# Patient Record
Sex: FEMALE | Race: WHITE
[De-identification: names, ages, dates, MRNs, and addresses within clinical notes are randomized per-mention and may not be internally consistent; named-entity substitution may affect disease eponyms.]

---

## 2017-06-08 ENCOUNTER — HOSPITAL ENCOUNTER (EMERGENCY)
Dept: HOSPITAL 45 - C.EDB | Age: 82
Discharge: HOME | End: 2017-06-08
Payer: COMMERCIAL

## 2017-06-08 VITALS
HEIGHT: 62.01 IN | WEIGHT: 142.64 LBS | WEIGHT: 142.64 LBS | HEIGHT: 62.01 IN | BODY MASS INDEX: 25.92 KG/M2 | BODY MASS INDEX: 25.92 KG/M2

## 2017-06-08 VITALS
OXYGEN SATURATION: 98 % | TEMPERATURE: 97.7 F | HEART RATE: 72 BPM | SYSTOLIC BLOOD PRESSURE: 145 MMHG | DIASTOLIC BLOOD PRESSURE: 72 MMHG

## 2017-06-08 DIAGNOSIS — Z82.3: ICD-10-CM

## 2017-06-08 DIAGNOSIS — Z98.1: ICD-10-CM

## 2017-06-08 DIAGNOSIS — M81.0: ICD-10-CM

## 2017-06-08 DIAGNOSIS — K21.9: ICD-10-CM

## 2017-06-08 DIAGNOSIS — E78.5: ICD-10-CM

## 2017-06-08 DIAGNOSIS — N20.0: Primary | ICD-10-CM

## 2017-06-08 DIAGNOSIS — Z79.899: ICD-10-CM

## 2017-06-08 DIAGNOSIS — Z86.711: ICD-10-CM

## 2017-06-08 DIAGNOSIS — I10: ICD-10-CM

## 2017-06-08 DIAGNOSIS — E86.0: ICD-10-CM

## 2017-06-08 DIAGNOSIS — Z87.828: ICD-10-CM

## 2017-06-08 DIAGNOSIS — Z87.442: ICD-10-CM

## 2017-06-08 DIAGNOSIS — Z90.710: ICD-10-CM

## 2017-06-08 DIAGNOSIS — Z88.5: ICD-10-CM

## 2017-06-08 LAB
ALP SERPL-CCNC: 60 U/L (ref 45–117)
ALT SERPL-CCNC: 17 U/L (ref 12–78)
ANION GAP SERPL CALC-SCNC: 9 MMOL/L (ref 3–11)
APPEARANCE UR: CLEAR
AST SERPL-CCNC: 13 U/L (ref 15–37)
BASOPHILS # BLD: 0.01 K/UL (ref 0–0.2)
BASOPHILS NFR BLD: 0.1 %
BILIRUB UR-MCNC: (no result) MG/DL
BUN SERPL-MCNC: 20 MG/DL (ref 7–18)
BUN/CREAT SERPL: 11.7 (ref 10–20)
CALCIUM SERPL-MCNC: 8.1 MG/DL (ref 8.5–10.1)
CHLORIDE SERPL-SCNC: 112 MMOL/L (ref 98–107)
CO2 SERPL-SCNC: 25 MMOL/L (ref 21–32)
COLOR UR: (no result)
COMPLETE: YES
CREAT CL PREDICTED SERPL C-G-VRATE: 21.4 ML/MIN
CREAT SERPL-MCNC: 1.7 MG/DL (ref 0.6–1.2)
EOSINOPHIL NFR BLD AUTO: 120 K/UL (ref 130–400)
GLUCOSE SERPL-MCNC: 129 MG/DL (ref 70–99)
HCT VFR BLD CALC: 44.8 % (ref 37–47)
IG%: 0.1 %
IMM GRANULOCYTES NFR BLD AUTO: 16.9 %
LYMPHOCYTES # BLD: 1.21 K/UL (ref 1.2–3.4)
MANUAL MICROSCOPIC REQUIRED?: NO
MCH RBC QN AUTO: 28.9 PG (ref 25–34)
MCHC RBC AUTO-ENTMCNC: 32.1 G/DL (ref 32–36)
MCV RBC AUTO: 89.8 FL (ref 80–100)
MONOCYTES NFR BLD: 7 %
NEUTROPHILS # BLD AUTO: 2.2 %
NEUTROPHILS NFR BLD AUTO: 73.7 %
NITRITE UR QL STRIP: (no result)
PH UR STRIP: 5 [PH] (ref 4.5–7.5)
PMV BLD AUTO: 10.6 FL (ref 7.4–10.4)
POTASSIUM SERPL-SCNC: 3.8 MMOL/L (ref 3.5–5.1)
RBC # BLD AUTO: 4.99 M/UL (ref 4.2–5.4)
REVIEW REQ?: NO
SODIUM SERPL-SCNC: 146 MMOL/L (ref 136–145)
SP GR UR STRIP: 1.03 (ref 1–1.03)
URINE BILL WITH OR WITHOUT MIC: (no result)
UROBILINOGEN UR-MCNC: (no result) MG/DL
WBC # BLD AUTO: 7.16 K/UL (ref 4.8–10.8)
ZZURINE CULT IF INDIC CATH: NO

## 2017-06-08 NOTE — EMERGENCY ROOM VISIT NOTE
History


Report prepared by Thomas:  Donna Watters


Under the Supervision of:  Dr. Narda Green M.D.


First contact with patient:  11:10


Chief Complaint:  UNABLE TO VOID


Stated Complaint:  UNABLE TO URINATE


Nursing Triage Summary:  


pt states she was seen in the ER yesterday and had IV fluids.  States she does 


not feel the urge to urinate but feels she should be urinating more since 


yesterday.  Bladder scanned for 50 ml and 46 ml.





History of Present Illness


The patient is a 85 year old female who presents to the Emergency Room with 

complaints of constant difficulty urinating beginning yesterday. The patient 

states that she was seen here yesterday and was diagnosed with 2 right sided 

kidney stones. She reports that since she left the ED she has not been able to 

urinate and has only had about 1 drop of urine. She complains of dysuria and 

right sided flank pain. She denies any fever. The patient rates her pain as a 5/

10 in severity. She notes that yesterday her pain was a 15/10 so it has gotten 

better. She reports that she drinks a lot of water and is concerned that she 

has not been urinating. The patient notes that she has not had a bowel movement 

today but does not feel like she needs to go.





   Source of History:  patient


   Onset:  yesterday


   Position:  other (urinary)


   Symptom Intensity:  5/10


   Quality:  other (difficulty)


   Timing:  constant


   Modifying Factors (Worsening):  urination


   Associated Symptoms:  No fevers


Note:


She complains of dysuria and right flank pain.





Review of Systems


See HPI for pertinent positives & negatives. A total of 10 systems reviewed and 

were otherwise negative.





Past Medical & Surgical


Medical Problems:


(1) Bronchitis


(2) Dislocation of PIP joint of finger


(3) Esophageal Reflux


(4) Hyperlipidemia Nec/Nos


(5) Hypertension Nos


(6) Kidney stones


(7) Osteoporosis Nos


(8) Pulmonary embolism


Surgical Problems:


(1) S/P hysterectomy


(2) S/P lumbar fusion








Family History





Stroke





Social History


Smoking Status:  Never Smoker


Alcohol Use:  none


Drug Use:  none


Marital Status:  


Occupation Status:  employed





Current/Historical Medications


Scheduled


Ondasetron Odt (Zofran Odt), 4 MG SL Q6H


Tamsulosin Hcl (Flomax), 0.4 MG PO DAILY





Scheduled PRN


Tramadol (Ultram), 1 TABS PO Q6 PRN for Pain





Allergies


Coded Allergies:  


     Morphine (Verified  Adverse Reaction, Mild, NAUSEA; HAS TOLERATED SOME 

DOSES IN RECENT PAST, 6/8/17)





Physical Exam


Vital Signs











  Date Time  Temp Pulse Resp B/P (MAP) Pulse Ox O2 Delivery O2 Flow Rate FiO2


 


6/8/17 16:21 36.5 72 16 145/72 98   


 


6/8/17 13:34  72 16 145/72 98 Room Air  


 


6/8/17 12:22  77      


 


6/8/17 12:14  78 16 152/69 98 Room Air  


 


6/8/17 10:23 36.5 73 20 154/79 95 Room Air  











Physical Exam


Vital signs reviewed.





General: Well-appearing, in no significant distress.





HEENT: No scleral icterus, PERRLA, neck supple.  Atraumatic.





Cardiovascular: Regular rate and rhythm, no extra sounds.





Pulmonary: Clear to auscultation bilaterally, normal work of breathing.





Abdomen: Soft, nontender, nondistended, positive bowel sounds.





Musculoskeletal: Atraumatic, no peripheral edema.





Neurologic: Patient awake alert and oriented x 3





Skin: Warm, dry, no rash





Medical Decision & Procedures


ER Provider


Diagnostic Interpretation:


ULTRASOUND KIDNEYS AND BLADDER





CLINICAL HISTORY: Nephrolithiasis. Right ureteral stone.





COMPARISON STUDY: Abdominal CT dated 6/7/2017.





TECHNIQUE: Real-time, grayscale, and color flow sonography of the kidneys and


bladder is performed. Images are reviewed in the transverse and longitudinal


planes.





FINDINGS:





Kidneys: The kidneys are atrophic. The right kidney measures 10.4 cm in length


and the left kidney measures 10.3 cm in length.  There is mild right


hydronephrosis. This is secondary to a known obstructing right ureteral


calculus. There is fullness of the left renal collecting system with no


left-sided hydronephrosis. Additional nonobstructing calculus is suggested in


the right lower pole. There is no sonographic evidence of contour deforming


renal mass lesion. No perinephric fluid is identified.





Bladder: The partially decompressed bladder is grossly unremarkable. A left


ureteral jet was seen.








IMPRESSION: 





1. The kidneys are atrophic.





2. There is mild right hydronephrosis secondary to a known obstructing ureteral


calculus. This was not seen by ultrasound but was identified on yesterday's


abdominal CT.





3. There is mild fullness of the left renal collecting system with no left-sided


hydronephrosis.





4. An additional nonobstructing calculus is noted in the right kidney.





5. The bladder was partially decompressed and grossly normal. A left ureteral


jet was seen.











Electronically signed by:  Willy Orellana M.D.


6/8/2017 3:34 PM





Dictated Date/Time:  6/8/2017 3:31 PM





Laboratory Results


6/8/17 12:25








Red Blood Count 4.99, Mean Corpuscular Volume 89.8, Mean Corpuscular Hemoglobin 

28.9, Mean Corpuscular Hemoglobin Concent 32.1, Mean Platelet Volume 10.6, 

Neutrophils (%) (Auto) 73.7, Lymphocytes (%) (Auto) 16.9, Monocytes (%) (Auto) 

7.0, Eosinophils (%) (Auto) 2.2, Basophils (%) (Auto) 0.1, Neutrophils # (Auto) 

5.27, Lymphocytes # (Auto) 1.21, Monocytes # (Auto) 0.50, Eosinophils # (Auto) 

0.16, Basophils # (Auto) 0.01





6/8/17 12:25

















Test


  6/8/17


12:25 6/8/17


12:30


 


White Blood Count


  7.16 K/uL


(4.8-10.8) 


 


 


Red Blood Count


  4.99 M/uL


(4.2-5.4) 


 


 


Hemoglobin


  14.4 g/dL


(12.0-16.0) 


 


 


Hematocrit 44.8 % (37-47)  


 


Mean Corpuscular Volume


  89.8 fL


() 


 


 


Mean Corpuscular Hemoglobin


  28.9 pg


(25-34) 


 


 


Mean Corpuscular Hemoglobin


Concent 32.1 g/dl


(32-36) 


 


 


Platelet Count


  120 K/uL


(130-400) 


 


 


Mean Platelet Volume


  10.6 fL


(7.4-10.4) 


 


 


Neutrophils (%) (Auto) 73.7 %  


 


Lymphocytes (%) (Auto) 16.9 %  


 


Monocytes (%) (Auto) 7.0 %  


 


Eosinophils (%) (Auto) 2.2 %  


 


Basophils (%) (Auto) 0.1 %  


 


Neutrophils # (Auto)


  5.27 K/uL


(1.4-6.5) 


 


 


Lymphocytes # (Auto)


  1.21 K/uL


(1.2-3.4) 


 


 


Monocytes # (Auto)


  0.50 K/uL


(0.11-0.59) 


 


 


Eosinophils # (Auto)


  0.16 K/uL


(0-0.5) 


 


 


Basophils # (Auto)


  0.01 K/uL


(0-0.2) 


 


 


RDW Standard Deviation


  44.7 fL


(36.4-46.3) 


 


 


RDW Coefficient of Variation


  13.6 %


(11.5-14.5) 


 


 


Immature Granulocyte % (Auto) 0.1 %  


 


Immature Granulocyte # (Auto)


  0.01 K/uL


(0.00-0.02) 


 


 


Anion Gap


  9.0 mmol/L


(3-11) 


 


 


Est Creatinine Clear Calc


Drug Dose 21.4 ml/min 


  


 


 


Estimated GFR (


American) 31.3 


  


 


 


Estimated GFR (Non-


American 27.0 


  


 


 


BUN/Creatinine Ratio 11.7 (10-20)  


 


Calcium Level


  8.1 mg/dl


(8.5-10.1) 


 


 


Total Bilirubin


  0.5 mg/dl


(0.2-1) 


 


 


Direct Bilirubin


  0.1 mg/dl


(0-0.2) 


 


 


Aspartate Amino Transf


(AST/SGOT) 13 U/L (15-37) 


  


 


 


Alanine Aminotransferase


(ALT/SGPT) 17 U/L (12-78) 


  


 


 


Alkaline Phosphatase


  60 U/L


() 


 


 


Total Protein


  5.8 gm/dl


(6.4-8.2) 


 


 


Albumin


  3.2 gm/dl


(3.4-5.0) 


 


 


Urine Color  DK YELLOW 


 


Urine Appearance  CLEAR (CLEAR) 


 


Urine pH  5.0 (4.5-7.5) 


 


Urine Specific Gravity


  


  1.033


(1.000-1.030)


 


Urine Protein  NEG (NEG) 


 


Urine Glucose (UA)  TRACE (NEG) 


 


Urine Ketones  TRACE (NEG) 


 


Urine Occult Blood  NEG (NEG) 


 


Urine Nitrite  NEG (NEG) 


 


Urine Bilirubin  NEG (NEG) 


 


Urine Urobilinogen  NEG (NEG) 


 


Urine Leukocyte Esterase  NEG (NEG) 





Laboratory results per my review.





Medications Administered











 Medications


  (Trade)  Dose


 Ordered  Sig/Kayla


 Route  Start Time


 Stop Time Status Last Admin


Dose Admin


 


 Sodium Chloride  1,000 ml @ 


 125 mls/hr  Q8H STAT


 IV  6/8/17 11:18


 6/8/17 16:30 DC 6/8/17 12:18


125 MLS/HR











ED Course


1110: Past medical records reviewed. The patient was evaluated in room A4. A 

complete history and physical examination was performed. 





1118: Zofran Inj 4mg IV, Fentanyl Inj 50mg IV, Sodium Chloride 1000 ml @ 125 mls

/hr IV.





1545: I spoke to Marisol Garcia and updated her on the patients case. 





1550: I reevaluated and updated the patient





1600: Upon reevaluation, the patient appeared to have improvement of her 

symptoms. I discussed findings with the patient. She verbalized agreement of 

the treatment plan. The patient was discharged home.





Medical Decision


Differential diagnosis includes urinary obstruction, dehydration, anxiety, UTI, 

bladder spasm. 





Medication Reconciliation: I attest that I have personally reviewed the patient'

s current medication list.


Blood Pressure Screening: Patient was found to have a slightly elevated blood 

pressure due to circumstances and age. I do not believe that the patient 

requires hypertension monitoring. 





This pt was evaluated and appeared to be in no distress.  IV access was 

obtained and lab work was drawn.  Pt was placed on the cardiac monitor.  IVF 

were initiated.  Pt declined pain medications.  CT results were reviewed.  US 

retroperitoneum was performed and reveals mild right hydronephrosis, likely r/t 

a ureteral stone identified on yesterday's CT.  Creatinine is bumped to 1.7.  

UA is negative for infection.  Pt was informed of the findings and a call was 

placed to urology.  KARLEY Branch advises that pt can f/u as scheduled 

in 5 days.  Pt was d/c with Rx for ultram.  She will return to the ED for 

worsening of symptoms or any medical concerns.





Consults


Time Called:  1540


Consulting Physician:  Marisol Garcia


Returned Call:  1545


I spoke to Marisol Garcia and updated her on the patients case.





Impression





 Primary Impression:  


 Kidney stone


 Additional Impression:  


 Dehydration





Scribe Attestation


The scribe's documentation has been prepared under my direction and personally 

reviewed by me in its entirety. I confirm that the note above accurately 

reflects all work, treatment, procedures, and medical decision making performed 

by me.





Departure Information


Dispostion


Home / Self-Care





Prescriptions





Tramadol (Ultram) 50 Mg Tab


1 TABS PO Q6 Y for Pain, #20 TAB


   Prov: Narda Green M.D.         6/8/17





Referrals


James Yadav D.O. (PCP)





Forms


HOME CARE DOCUMENTATION FORM,                                                 

               IMPORTANT VISIT INFORMATION, WORK / SCHOOL INSTRUCTIONS





Patient Instructions


My [facility]





Additional Instructions





Diagnosis: Dehydration, kidney stone





Drink plenty of clear fluids.





Tylenol 650 mg every 6 hours as needed for pain.





Ultram 50 mg every 6 hours as needed for severe pain.





Follow-up with KARLEY Branch at Dr. Solorio's office 6/13/17 at 1:40





Please return to the ER for worsening of symptoms or any medical concerns.





Problem Qualifiers

## 2017-06-08 NOTE — DIAGNOSTIC IMAGING REPORT
ULTRASOUND KIDNEYS AND BLADDER



CLINICAL HISTORY: Nephrolithiasis. Right ureteral stone.



COMPARISON STUDY: Abdominal CT dated 6/7/2017.



TECHNIQUE: Real-time, grayscale, and color flow sonography of the kidneys and

bladder is performed. Images are reviewed in the transverse and longitudinal

planes.



FINDINGS:



Kidneys: The kidneys are atrophic. The right kidney measures 10.4 cm in length

and the left kidney measures 10.3 cm in length.  There is mild right

hydronephrosis. This is secondary to a known obstructing right ureteral

calculus. There is fullness of the left renal collecting system with no

left-sided hydronephrosis. Additional nonobstructing calculus is suggested in

the right lower pole. There is no sonographic evidence of contour deforming

renal mass lesion. No perinephric fluid is identified.



Bladder: The partially decompressed bladder is grossly unremarkable. A left

ureteral jet was seen.





IMPRESSION: 



1. The kidneys are atrophic.



2. There is mild right hydronephrosis secondary to a known obstructing ureteral

calculus. This was not seen by ultrasound but was identified on yesterday's

abdominal CT.



3. There is mild fullness of the left renal collecting system with no left-sided

hydronephrosis.



4. An additional nonobstructing calculus is noted in the right kidney.



5. The bladder was partially decompressed and grossly normal. A left ureteral

jet was seen.







Electronically signed by:  Willy Orellana M.D.

6/8/2017 3:34 PM



Dictated Date/Time:  6/8/2017 3:31 PM

## 2017-06-13 ENCOUNTER — HOSPITAL ENCOUNTER (OUTPATIENT)
Dept: HOSPITAL 45 - C.LABSPEC | Age: 82
Discharge: HOME | End: 2017-06-13
Attending: NURSE PRACTITIONER
Payer: COMMERCIAL

## 2017-06-13 DIAGNOSIS — N20.1: Primary | ICD-10-CM

## 2017-06-20 ENCOUNTER — HOSPITAL ENCOUNTER (OUTPATIENT)
Dept: HOSPITAL 45 - C.RAD | Age: 82
Discharge: HOME | End: 2017-06-20
Attending: NURSE PRACTITIONER
Payer: COMMERCIAL

## 2017-06-20 DIAGNOSIS — N20.1: Primary | ICD-10-CM

## 2017-06-20 LAB
ANION GAP SERPL CALC-SCNC: 9 MMOL/L (ref 3–11)
BUN SERPL-MCNC: 15 MG/DL (ref 7–18)
BUN/CREAT SERPL: 15.3 (ref 10–20)
CALCIUM SERPL-MCNC: 8.3 MG/DL (ref 8.5–10.1)
CHLORIDE SERPL-SCNC: 106 MMOL/L (ref 98–107)
CO2 SERPL-SCNC: 28 MMOL/L (ref 21–32)
CREAT SERPL-MCNC: 0.98 MG/DL (ref 0.6–1.2)
GLUCOSE SERPL-MCNC: 226 MG/DL (ref 70–99)
POTASSIUM SERPL-SCNC: 3.6 MMOL/L (ref 3.5–5.1)
SODIUM SERPL-SCNC: 143 MMOL/L (ref 136–145)

## 2017-06-20 NOTE — DIAGNOSTIC IMAGING REPORT
[facility]



CLINICAL HISTORY: N20.1 Ureteral [id number] nephrocalcinosis



COMPARISON STUDY:  4/29/2016 CT 6/7/2017 



FINDINGS: The soft tissues, psoas shadows, renal outlines and intestinal gas

pattern appear normal. There is no evidence for bowel obstruction. No abnormal

abdominal calcifications are seen. The proximal right ureteral calculi are not

seen by plain film criteria.



IMPRESSION:  Negative study. Proximal right ureteral calculi previously

described are not seen by plain film criteria. 







Electronically signed by:  Ashutosh Fisher M.D.

6/20/2017 8:40 AM



Dictated Date/Time:  6/20/2017 8:38 AM

## 2017-08-01 ENCOUNTER — HOSPITAL ENCOUNTER (OUTPATIENT)
Dept: HOSPITAL 45 - C.RAD | Age: 82
Discharge: HOME | End: 2017-08-01
Attending: UROLOGY
Payer: COMMERCIAL

## 2017-08-01 DIAGNOSIS — N20.1: Primary | ICD-10-CM

## 2017-08-01 NOTE — DIAGNOSTIC IMAGING REPORT
[facility]



CLINICAL HISTORY: Right-sided nephrolithiasis    



COMPARISON STUDY:  6/20/2017 



FINDINGS: There is no pathologic bowel dilatation. No renal calculi are

visualized on conventional radiographic imaging. Pelvic basin calcifications

likely represent phleboliths. There are stable chronic deformities of the iliac

bones.



IMPRESSION:  No urinary tract calculi are visualized on conventional

radiographic imaging 







Electronically signed by:  Rene Gee M.D.

8/1/2017 9:58 AM



Dictated Date/Time:  8/1/2017 9:57 AM

## 2017-08-28 ENCOUNTER — HOSPITAL ENCOUNTER (OUTPATIENT)
Dept: HOSPITAL 45 - C.LAB | Age: 82
Discharge: HOME | End: 2017-08-28
Attending: UROLOGY
Payer: COMMERCIAL

## 2017-08-28 DIAGNOSIS — N20.1: Primary | ICD-10-CM

## 2017-08-28 LAB
ANION GAP SERPL CALC-SCNC: 6 MMOL/L (ref 3–11)
BUN SERPL-MCNC: 14 MG/DL (ref 7–18)
BUN/CREAT SERPL: 14.3 (ref 10–20)
CALCIUM SERPL-MCNC: 8.4 MG/DL (ref 8.5–10.1)
CHLORIDE SERPL-SCNC: 108 MMOL/L (ref 98–107)
CO2 SERPL-SCNC: 28 MMOL/L (ref 21–32)
CREAT SERPL-MCNC: 1 MG/DL (ref 0.6–1.2)
GLUCOSE SERPL-MCNC: 244 MG/DL (ref 70–99)
POTASSIUM SERPL-SCNC: 4 MMOL/L (ref 3.5–5.1)
SODIUM SERPL-SCNC: 142 MMOL/L (ref 136–145)
URATE SERPL-MCNC: 6 MG/DL (ref 2.6–7.2)

## 2017-09-25 ENCOUNTER — HOSPITAL ENCOUNTER (OUTPATIENT)
Dept: HOSPITAL 45 - C.ULTR | Age: 82
Discharge: HOME | End: 2017-09-25
Attending: UROLOGY
Payer: COMMERCIAL

## 2017-09-25 DIAGNOSIS — N20.1: Primary | ICD-10-CM

## 2018-07-24 ENCOUNTER — HOSPITAL ENCOUNTER (OUTPATIENT)
Dept: HOSPITAL 45 - C.ULTR | Age: 83
Discharge: HOME | End: 2018-07-24
Attending: UROLOGY
Payer: COMMERCIAL

## 2018-07-24 DIAGNOSIS — N20.0: Primary | ICD-10-CM

## 2018-07-24 NOTE — DIAGNOSTIC IMAGING REPORT
RENAL ULTRASOUND



CLINICAL HISTORY: Uric acid nephrolithiasis.   



COMPARISON STUDY:  CT of the abdomen and pelvis June 22, 2018 and KUB July 5, 2018.



TECHNIQUE:  Sonography of the kidneys and the urinary bladder was performed.



FINDINGS: The right kidney measures 9.8 x 5.3 x 4.6 cm and the left measures 9.5

x 5.4 x 5.3 cm. There is no hydronephrosis. Left hydronephrosis shown on CT of

June 22, 2018 has resolved. Left-sided parapelvic cysts are noted. Note is made

of a 3 mm right renal calculus. Both ureteral jets were identified. No renal

mass was identified. 



IMPRESSION: 



1. Interval resolution of left hydronephrosis.



2. 3 mm right renal calculus.







Electronically signed by:  Gino Smith M.D.

7/24/2018 12:57 PM



Dictated Date/Time:  7/24/2018 12:55 PM